# Patient Record
Sex: MALE | Race: WHITE | Employment: FULL TIME | URBAN - METROPOLITAN AREA
[De-identification: names, ages, dates, MRNs, and addresses within clinical notes are randomized per-mention and may not be internally consistent; named-entity substitution may affect disease eponyms.]

---

## 2017-01-18 ENCOUNTER — TELEPHONE (OUTPATIENT)
Dept: FAMILY MEDICINE | Facility: OTHER | Age: 52
End: 2017-01-18

## 2017-01-18 DIAGNOSIS — I10 ESSENTIAL HYPERTENSION, BENIGN: ICD-10-CM

## 2017-01-18 DIAGNOSIS — Z13.6 CARDIOVASCULAR SCREENING; LDL GOAL LESS THAN 130: Primary | ICD-10-CM

## 2017-01-18 NOTE — TELEPHONE ENCOUNTER
Summary:    Patient is due/failing the following:   BP CHECK and PHYSICAL with fasting labs    Action needed:   Patient needs office visit for physical .    Type of outreach:    Phone, spoke to patient.  patient states he will have to call back to schedule when he knows he will be home.    Questions for provider review:    None                                                                                                                                    Cat Madison       Chart routed to Care Team .        Panel Management Review      Patient has the following on his problem list:     Hypertension   Last three blood pressure readings:  BP Readings from Last 3 Encounters:   04/06/16 116/96   03/21/16 160/100   05/09/15 146/98     Blood pressure: Failed    HTN Guidelines:  Age 18-59 BP range:  Less than 140/90  Age 60-85 with Diabetes:  Less than 140/90  Age 60-85 without Diabetes:  less than 150/90      Composite cancer screening  Chart review shows that this patient is due/due soon for the following None

## 2017-02-24 ENCOUNTER — TELEPHONE (OUTPATIENT)
Dept: FAMILY MEDICINE | Facility: OTHER | Age: 52
End: 2017-02-24

## 2017-02-24 NOTE — TELEPHONE ENCOUNTER
.  Panel Management Review      Patient has the following on his problem list:     Hypertension   Last three blood pressure readings:  BP Readings from Last 3 Encounters:   04/06/16 (!) 116/96   03/21/16 (!) 160/100   05/09/15 (!) 146/98     Blood pressure: Failed    HTN Guidelines:  Age 18-59 BP range:  Less than 140/90  Age 60-85 with Diabetes:  Less than 140/90  Age 60-85 without Diabetes:  less than 150/90      Composite cancer screening  Chart review shows that this patient is due/due soon for the following None  Summary:    Patient is due/failing the following:   Physical, LDL, Tdap, Flushot, BMP     Action needed:   Patient needs office visit for physical. and Patient needs fasting lab only appointment    Type of outreach:    Phone, spoke to patient.  patient is currently working out of town. he will call 2 days in advanced to schedule appointment. cancelling orders until patient schedules appointment.    Questions for provider review:    None                                                                                                                                    Khalida Rahman MA       Chart routed to none .

## 2017-03-24 ENCOUNTER — OFFICE VISIT (OUTPATIENT)
Dept: FAMILY MEDICINE | Facility: OTHER | Age: 52
End: 2017-03-24
Payer: COMMERCIAL

## 2017-03-24 VITALS
HEIGHT: 70 IN | SYSTOLIC BLOOD PRESSURE: 138 MMHG | TEMPERATURE: 98.3 F | RESPIRATION RATE: 16 BRPM | DIASTOLIC BLOOD PRESSURE: 94 MMHG | BODY MASS INDEX: 39.27 KG/M2 | HEART RATE: 88 BPM | WEIGHT: 274.3 LBS

## 2017-03-24 DIAGNOSIS — I10 HYPERTENSION GOAL BP (BLOOD PRESSURE) < 140/90: ICD-10-CM

## 2017-03-24 DIAGNOSIS — R14.1 FLATULENCE, ERUCTATION, AND GAS PAIN: ICD-10-CM

## 2017-03-24 DIAGNOSIS — K59.00 CONSTIPATION, UNSPECIFIED CONSTIPATION TYPE: Primary | ICD-10-CM

## 2017-03-24 DIAGNOSIS — R14.2 FLATULENCE, ERUCTATION, AND GAS PAIN: ICD-10-CM

## 2017-03-24 DIAGNOSIS — R14.3 FLATULENCE, ERUCTATION, AND GAS PAIN: ICD-10-CM

## 2017-03-24 DIAGNOSIS — R10.13 EPIGASTRIC DISCOMFORT: ICD-10-CM

## 2017-03-24 PROCEDURE — 99214 OFFICE O/P EST MOD 30 MIN: CPT | Performed by: FAMILY MEDICINE

## 2017-03-24 RX ORDER — SIMETHICONE 125 MG
1 CAPSULE ORAL
Qty: 90 CAPSULE | Refills: 1 | Status: SHIPPED | OUTPATIENT
Start: 2017-03-24

## 2017-03-24 ASSESSMENT — PAIN SCALES - GENERAL: PAINLEVEL: NO PAIN (0)

## 2017-03-24 NOTE — MR AVS SNAPSHOT
After Visit Summary   3/24/2017    Leo Swartz    MRN: [id number]           Patient Information     Date Of Birth          1965        Visit Information        Provider Department      3/24/2017 1:40 PM Angela Patricio MD [facility]        Today's Diagnoses     Constipation, unspecified constipation type    -  1    Epigastric discomfort        Flatulence, eructation, and gas pain           Follow-ups after your visit        Additional Services     GASTROENTEROLOGY ADULT REF CONSULT ONLY       Preferred Location: [facility] [place] [phone]      Please be aware that coverage of these services is subject to the terms and limitations of your health insurance plan.  Call member services at your health plan with any benefit or coverage questions.  Any procedures must be performed at a [place] facility OR coordinated by your clinic's referral office.    Please bring the following with you to your appointment:    (1) Any X-Rays, CTs or MRIs which have been performed.  Contact the facility where they were done to arrange for  prior to your scheduled appointment.    (2) List of current medications   (3) This referral request   (4) Any documents/labs given to you for this referral                  Who to contact     If you have questions or need follow up information about today's clinic visit or your schedule please contact [facility] directly at [phone].  Normal or non-critical lab and imaging results will be communicated to you by MyChart, letter or phone within 4 business days after the clinic has received the results. If you do not hear from us within 7 days, please contact the clinic through MyChart or phone. If you have a critical or abnormal lab result, we will notify you by phone as soon as possible.  Submit refill requests through [employer] or call your pharmacy and they will forward the refill request to us. Please allow 3 business  "days for your refill to be completed.          Additional Information About Your Visit        [employer] Information     [employer] lets you send messages to your doctor, view your test results, renew your prescriptions, schedule appointments and more. To sign up, go to [email or web address] . Click on \"Log in\" on the left side of the screen, which will take you to the Welcome page. Then click on \"Sign up Now\" on the right side of the page.     You will be asked to enter the access code listed below, as well as some personal information. Please follow the directions to create your username and password.     Your access code is: [email or web address]  Expires: 2017  2:11 PM     Your access code will  in 90 days. If you need help or a new code, please call your [place] clinic or [phone].        Care EveryWhere ID     This is your Care EveryWhere ID. This could be used by other organizations to access your [place] medical records  [id number]        Your Vitals Were     Pulse Temperature Respirations Height BMI (Body Mass Index)       88 98.3  F (36.8  C) (Temporal) 16 5' 10\" (1.778 m) 39.36 kg/m2        Blood Pressure from Last 3 Encounters:   17 (!) 138/94   16 (!) 116/96   16 (!) 160/100    Weight from Last 3 Encounters:   17 274 lb 4.8 oz (124.4 kg)   16 278 lb 3.2 oz (126.2 kg)   16 278 lb 3.2 oz (126.2 kg)              We Performed the Following     GASTROENTEROLOGY ADULT REF CONSULT ONLY          Today's Medication Changes          These changes are accurate as of: 3/24/17  2:18 PM.  If you have any questions, ask your nurse or doctor.               Start taking these medicines.        Dose/Directions    Simethicone 125 MG Caps   Used for:  Flatulence, eructation, and gas pain   Started by:  Angela Patricio MD        Dose:  1 capsule   Take 1 capsule by mouth 3 times daily (with meals) And at bedtime as needed   Quantity:  90 capsule   Refills:  1          "   Where to get your medicines      These medications were sent to [facility] [place] [facility] [place] - [address]   [address] [place] [address]     Phone:  [phone]     Simethicone 125 MG Caps                Primary Care Provider Office Phone # Fax #    Angela Carole Patricio [phone]       [facility] [address] [place] [address]        Thank you!     Thank you for choosing [facility]  for your care. Our goal is always to provide you with excellent care. Hearing back from our patients is one way we can continue to improve our services. Please take a few minutes to complete the written survey that you may receive in the mail after your visit with us. Thank you!             Your Updated Medication List - Protect others around you: Learn how to safely use, store and throw away your medicines at www.disposemymeds.org.          This list is accurate as of: 3/24/17  2:18 PM.  Always use your most recent med list.                   Brand Name Dispense Instructions for use    NONFORMULARY      Reported on 3/24/2017       Simethicone 125 MG Caps     90 capsule    Take 1 capsule by mouth 3 times daily (with meals) And at bedtime as needed       TYLENOL PO      Take 1,000 mg by mouth every 4 hours as needed

## 2017-03-24 NOTE — NURSING NOTE
"Chief Complaint   Patient presents with     Constipation     Panel Management     MyChart, Tdap, Hep C, BMP       Initial BP (!) 138/94 (BP Location: Left arm, Cuff Size: Adult Large)  Pulse 88  Temp 98.3  F (36.8  C) (Temporal)  Resp 16  Ht 5' 10\" (1.778 m)  Wt 274 lb 4.8 oz (124.4 kg)  BMI 39.36 kg/m2 Estimated body mass index is 39.36 kg/(m^2) as calculated from the following:    Height as of this encounter: 5' 10\" (1.778 m).    Weight as of this encounter: 274 lb 4.8 oz (124.4 kg).  Medication Reconciliation: complete     Khalida Rahman MA    "

## 2017-05-09 ENCOUNTER — TELEPHONE (OUTPATIENT)
Dept: FAMILY MEDICINE | Facility: OTHER | Age: 52
End: 2017-05-09

## 2017-05-09 NOTE — TELEPHONE ENCOUNTER
You placed a referral to [facility] for a consult on 3/24/17.    It is unclear if the patient has scheduled yet, not finding a report showing they were seen.     Please forward to your team if further follow up is needed to see if they have made this appointment.      Thank you!   Marilyn/Referral Representative for [facility]

## 2017-05-09 NOTE — LETTER
May 10, 2017      Leo Swartz  [address]  [place] [address]              Dear Leo Swartz,    We received a notice that you are to be scheduled with a specialty clinic. The referral has been placed by your provider and you can call to schedule an appointment directly.     Enclosed, you will find the referral with the phone number to call to schedule an appointment.    Please call us if you have any questions or concerns.      Sincerely,    Your [facility] Care Team

## 2017-05-09 NOTE — TELEPHONE ENCOUNTER
Left message for patient to return call to clinic. When call is returned please ask patient about referral and if he needs assistance in scheduled appointment.    Khalida Rahman MA

## 2017-05-11 ENCOUNTER — TRANSFERRED RECORDS (OUTPATIENT)
Dept: HEALTH INFORMATION MANAGEMENT | Facility: CLINIC | Age: 52
End: 2017-05-11

## 2017-05-18 ENCOUNTER — TELEPHONE (OUTPATIENT)
Dept: FAMILY MEDICINE | Facility: OTHER | Age: 52
End: 2017-05-18

## 2017-05-18 DIAGNOSIS — I10 HYPERTENSION GOAL BP (BLOOD PRESSURE) < 140/90: Primary | ICD-10-CM

## 2017-05-18 DIAGNOSIS — Z11.59 NEED FOR HEPATITIS C SCREENING TEST: ICD-10-CM

## 2017-05-18 DIAGNOSIS — Z13.6 CARDIOVASCULAR SCREENING; LDL GOAL LESS THAN 130: ICD-10-CM

## 2017-05-18 NOTE — TELEPHONE ENCOUNTER
Summary:    Patient is due/failing the following:   BMP and BP CHECK    Action needed:   Patient needs non-fasting lab only appointment and Patient needs nurse only appointment.    Type of outreach:    Phone, left message for patient to call back.     Questions for provider review:    None                                                                                                                                    Cat Madison       Chart routed to Care Team .        Panel Management Review      Patient has the following on his problem list:     Hypertension   Last three blood pressure readings:  BP Readings from Last 3 Encounters:   03/24/17 (!) 138/94   04/06/16 (!) 116/96   03/21/16 (!) 160/100     Blood pressure: FAILED    HTN Guidelines:  Age 18-59 BP range:  Less than 140/90  Age 60-85 with Diabetes:  Less than 140/90  Age 60-85 without Diabetes:  less than 150/90      Composite cancer screening  Chart review shows that this patient is due/due soon for the following None

## 2017-05-18 NOTE — TELEPHONE ENCOUNTER
This patient has an appointment for lab work but does not have any orders. Please place some orders.    Thank You,  Lynn Winn MLT (ASCP)

## 2017-05-18 NOTE — TELEPHONE ENCOUNTER
Per Health Maintenance Patient is due for a BMP and Hep C. Please sign and add anything else you would like done.  Pamela Rush CMA ([facility])

## 2017-05-26 ENCOUNTER — ALLIED HEALTH/NURSE VISIT (OUTPATIENT)
Dept: FAMILY MEDICINE | Facility: OTHER | Age: 52
End: 2017-05-26
Payer: COMMERCIAL

## 2017-05-26 VITALS — DIASTOLIC BLOOD PRESSURE: 88 MMHG | SYSTOLIC BLOOD PRESSURE: 140 MMHG | HEART RATE: 66 BPM

## 2017-05-26 DIAGNOSIS — I10 HYPERTENSION GOAL BP (BLOOD PRESSURE) < 140/90: ICD-10-CM

## 2017-05-26 DIAGNOSIS — I10 HYPERTENSION GOAL BP (BLOOD PRESSURE) < 140/90: Primary | ICD-10-CM

## 2017-05-26 DIAGNOSIS — Z13.6 CARDIOVASCULAR SCREENING; LDL GOAL LESS THAN 130: ICD-10-CM

## 2017-05-26 DIAGNOSIS — Z11.59 NEED FOR HEPATITIS C SCREENING TEST: ICD-10-CM

## 2017-05-26 LAB
ANION GAP SERPL CALCULATED.3IONS-SCNC: 10 MMOL/L (ref 3–14)
BUN SERPL-MCNC: 19 MG/DL (ref 7–30)
CALCIUM SERPL-MCNC: 9 MG/DL (ref 8.5–10.1)
CHLORIDE SERPL-SCNC: 108 MMOL/L (ref 94–109)
CHOLEST SERPL-MCNC: 219 MG/DL
CO2 SERPL-SCNC: 23 MMOL/L (ref 20–32)
CREAT SERPL-MCNC: 0.9 MG/DL (ref 0.66–1.25)
GFR SERPL CREATININE-BSD FRML MDRD: 88 ML/MIN/1.7M2
GLUCOSE SERPL-MCNC: 86 MG/DL (ref 70–99)
HDLC SERPL-MCNC: 46 MG/DL
LDLC SERPL CALC-MCNC: 137 MG/DL
NONHDLC SERPL-MCNC: 173 MG/DL
POTASSIUM SERPL-SCNC: 4.2 MMOL/L (ref 3.4–5.3)
SODIUM SERPL-SCNC: 141 MMOL/L (ref 133–144)
TRIGL SERPL-MCNC: 178 MG/DL

## 2017-05-26 PROCEDURE — 99207 ZZC NO CHARGE NURSE ONLY: CPT

## 2017-05-26 PROCEDURE — 80061 LIPID PANEL: CPT | Performed by: FAMILY MEDICINE

## 2017-05-26 PROCEDURE — 36415 COLL VENOUS BLD VENIPUNCTURE: CPT | Performed by: FAMILY MEDICINE

## 2017-05-26 PROCEDURE — 86803 HEPATITIS C AB TEST: CPT | Performed by: FAMILY MEDICINE

## 2017-05-26 PROCEDURE — 80048 BASIC METABOLIC PNL TOTAL CA: CPT | Performed by: FAMILY MEDICINE

## 2017-05-26 NOTE — NURSING NOTE
Rechecked BP and it is lower but not below goal, pt stated he still does not want to take meds.        Eleonora Toure CMA ([facility])

## 2017-05-26 NOTE — LETTER
[facility]  [address]  [place] [address] [phone]             June 6, 2017    Leo Swartz  [address]  [facility] [address]              Dear Leo,     Your recent blood tests show that   1. Hepatitis C screening test is negative  2. Electrolyte levels and kidney function tests and normal  3. Some of your lipid  levels are high . I will recommend initially trying life style changes / modification to see if this will help.  Regular aerobics exercises and low cholesterol diet has been shown to help improve these numbers ..  Enclosed is a copy of the results.  It was a pleasure to see you at your last appointment.     Results for orders placed or performed in visit on 05/26/17   Basic metabolic panel  (Ca, Cl, CO2, Creat, Gluc, K, Na, BUN)   Result Value Ref Range    Sodium 141 133 - 144 mmol/L    Potassium 4.2 3.4 - 5.3 mmol/L    Chloride 108 94 - 109 mmol/L    Carbon Dioxide 23 20 - 32 mmol/L    Anion Gap 10 3 - 14 mmol/L    Glucose 86 70 - 99 mg/dL    Urea Nitrogen 19 7 - 30 mg/dL    Creatinine 0.90 0.66 - 1.25 mg/dL    GFR Estimate 88 >60 mL/min/1.7m2    GFR Estimate If Black >90   GFR Calc   >60 mL/min/1.7m2    Calcium 9.0 8.5 - 10.1 mg/dL   Hepatitis C antibody   Result Value Ref Range    Hepatitis C Antibody  NR     Nonreactive   Assay performance characteristics have not been established for newborns,   infants, and children     Lipid Profile (Chol, Trig, HDL, LDL calc)   Result Value Ref Range    Cholesterol 219 (H) <200 mg/dL    Triglycerides 178 (H) <150 mg/dL    HDL Cholesterol 46 >39 mg/dL    LDL Cholesterol Calculated 137 (H) <100 mg/dL    Non HDL Cholesterol 173 (H) <130 mg/dL         If you have any questions or concerns, please call myself or my nurse at [phone].      Sincerely,    Angela Patricio MD

## 2017-05-26 NOTE — NURSING NOTE
Leo Swartz is a 51 year old male who comes in today for a Blood Pressure check because of stomach issues so wants to check BP.    *Document pulse and BP  *Use new set of vitals button for multiple readings.  *Use extended vitals for orthostatic    Vitals as recorded, a large cuff was used.    Is not prescribed any BP medication.      Current complaints: Stomach issue has been seen in [place] for this.    Disposition: results routed to MD/DIANDRA

## 2017-05-28 LAB — HCV AB SERPL QL IA: NORMAL

## 2017-06-06 NOTE — PROGRESS NOTES
Your recent blood tests show that   1. Hepatitis C screening test is negative  2. Electrolyte levels and kidney function tests and normal  3. Some of your lipid  levels are high . I will recommend initially trying life style changes / modification to see if this will help.  Regular aerobics exercises and low cholesterol diet has been shown to help improve these numbers .

## 2017-08-23 ENCOUNTER — TELEPHONE (OUTPATIENT)
Dept: FAMILY MEDICINE | Facility: OTHER | Age: 52
End: 2017-08-23

## 2017-08-23 NOTE — TELEPHONE ENCOUNTER
Pt returned our call and was given message below and states that he doesn't want to schedule at this time.

## 2017-08-23 NOTE — TELEPHONE ENCOUNTER
Summary:    Patient is due/failing the following:   BP CHECK and PHYSICAL    Action needed:   Patient needs office visit for Physical and BP check.    Type of outreach:    Phone, left message for patient to call back.     Questions for provider review:    None                                                                                                                                    Cat Madison       Chart routed to Care Team .      Panel Management Review      Patient has the following on his problem list:     Hypertension   Last three blood pressure readings:  BP Readings from Last 3 Encounters:   05/26/17 140/88   03/24/17 (!) 138/94   04/06/16 (!) 116/96     Blood pressure: FAILED    HTN Guidelines:  Age 18-59 BP range:  Less than 140/90  Age 60-85 with Diabetes:  Less than 140/90  Age 60-85 without Diabetes:  less than 150/90        Composite cancer screening  Chart review shows that this patient is due/due soon for the following None

## 2017-12-07 ENCOUNTER — TELEPHONE (OUTPATIENT)
Dept: FAMILY MEDICINE | Facility: OTHER | Age: 52
End: 2017-12-07

## 2017-12-07 NOTE — LETTER
[facility]  [address] [place] [address]  Phone: [phone]  January 26, 2018      Leo Swartz  [address]  [facility] [address]      Dear Leo,    We care about your health and have reviewed your health plan including your medical conditions, medications, and lab results.  Based on this review, it is recommended that you follow up regarding the following health topic(s):  -High Blood Pressure  -Wellness (Physical) Visit     We recommend you take the following action(s):  -Schedule a Wellness(Physical) Visit.      Please call us at the [facility] - [phone] (or use [employer]) to address the above recommendations.     Thank you for trusting [facility] and we appreciate the opportunity to serve you.  We look forward to supporting your healthcare needs in the future.    Healthy Regards,    Your Health Care Team  [facility]

## 2017-12-07 NOTE — TELEPHONE ENCOUNTER
Summary:    Patient is due/failing the following:   BP CHECK and PHYSICAL    Action needed:   Patient needs office visit for Physical .    Type of outreach:    Phone, left message for patient to call back.     Questions for provider review:    None                                                                                                                                    Cat Madison       Chart routed to Care Team .        Panel Management Review      Patient has the following on his problem list:     Hypertension   Last three blood pressure readings:  BP Readings from Last 3 Encounters:   05/26/17 140/88   03/24/17 (!) 138/94   04/06/16 (!) 116/96     Blood pressure: FAILED    HTN Guidelines:  Age 18-59 BP range:  Less than 140/90  Age 60-85 with Diabetes:  Less than 140/90  Age 60-85 without Diabetes:  less than 150/90        Composite cancer screening  Chart review shows that this patient is due/due soon for the following None

## 2020-01-06 ENCOUNTER — HOSPITAL ENCOUNTER (EMERGENCY)
Facility: CLINIC | Age: 55
Discharge: HOME | End: 2020-01-06
Attending: EMERGENCY MEDICINE | Admitting: EMERGENCY MEDICINE
Payer: COMMERCIAL

## 2020-01-06 ENCOUNTER — APPOINTMENT (OUTPATIENT)
Dept: CT IMAGING | Facility: CLINIC | Age: 55
End: 2020-01-06
Attending: EMERGENCY MEDICINE
Payer: COMMERCIAL

## 2020-01-06 ENCOUNTER — NURSE TRIAGE (OUTPATIENT)
Dept: NURSING | Facility: CLINIC | Age: 55
End: 2020-01-06

## 2020-01-06 VITALS
OXYGEN SATURATION: 98 % | SYSTOLIC BLOOD PRESSURE: 169 MMHG | WEIGHT: 274 LBS | TEMPERATURE: 98.5 F | RESPIRATION RATE: 16 BRPM | BODY MASS INDEX: 39.22 KG/M2 | HEART RATE: 80 BPM | HEIGHT: 70 IN | DIASTOLIC BLOOD PRESSURE: 115 MMHG

## 2020-01-06 DIAGNOSIS — R10.13 ABDOMINAL PAIN, EPIGASTRIC: ICD-10-CM

## 2020-01-06 LAB
ALBUMIN SERPL-MCNC: 4.1 G/DL (ref 3.4–5)
ALBUMIN UR-MCNC: NEGATIVE MG/DL
ALP SERPL-CCNC: 103 U/L (ref 40–150)
ALT SERPL W P-5'-P-CCNC: 34 U/L (ref 0–70)
ANION GAP SERPL CALCULATED.3IONS-SCNC: 7 MMOL/L (ref 3–14)
APPEARANCE UR: CLEAR
AST SERPL W P-5'-P-CCNC: 17 U/L (ref 0–45)
BACTERIA #/AREA URNS HPF: ABNORMAL /HPF
BASOPHILS # BLD AUTO: 0 10E9/L (ref 0–0.2)
BASOPHILS NFR BLD AUTO: 0.4 %
BILIRUB SERPL-MCNC: 0.3 MG/DL (ref 0.2–1.3)
BILIRUB UR QL STRIP: NEGATIVE
BUN SERPL-MCNC: 21 MG/DL (ref 7–30)
CALCIUM SERPL-MCNC: 8.9 MG/DL (ref 8.5–10.1)
CHLORIDE SERPL-SCNC: 103 MMOL/L (ref 94–109)
CO2 SERPL-SCNC: 28 MMOL/L (ref 20–32)
COLOR UR AUTO: YELLOW
CREAT SERPL-MCNC: 0.94 MG/DL (ref 0.66–1.25)
DIFFERENTIAL METHOD BLD: NORMAL
EOSINOPHIL NFR BLD AUTO: 1.3 %
ERYTHROCYTE [DISTWIDTH] IN BLOOD BY AUTOMATED COUNT: 12.5 % (ref 10–15)
GFR SERPL CREATININE-BSD FRML MDRD: 90 ML/MIN/{1.73_M2}
GLUCOSE SERPL-MCNC: 100 MG/DL (ref 70–99)
GLUCOSE UR STRIP-MCNC: NEGATIVE MG/DL
HCT VFR BLD AUTO: 47.8 % (ref 40–53)
HGB BLD-MCNC: 16 G/DL (ref 13.3–17.7)
HGB UR QL STRIP: ABNORMAL
IMM GRANULOCYTES # BLD: 0 10E9/L (ref 0–0.4)
IMM GRANULOCYTES NFR BLD: 0.3 %
KETONES UR STRIP-MCNC: NEGATIVE MG/DL
LEUKOCYTE ESTERASE UR QL STRIP: NEGATIVE
LIPASE SERPL-CCNC: 118 U/L (ref 73–393)
LYMPHOCYTES # BLD AUTO: 1.6 10E9/L (ref 0.8–5.3)
LYMPHOCYTES NFR BLD AUTO: 22.6 %
MCH RBC QN AUTO: 29.4 PG (ref 26.5–33)
MCHC RBC AUTO-ENTMCNC: 33.5 G/DL (ref 31.5–36.5)
MCV RBC AUTO: 88 FL (ref 78–100)
MONOCYTES # BLD AUTO: 0.7 10E9/L (ref 0–1.3)
MONOCYTES NFR BLD AUTO: 9.3 %
MUCOUS THREADS #/AREA URNS LPF: PRESENT /LPF
NEUTROPHILS # BLD AUTO: 4.7 10E9/L (ref 1.6–8.3)
NEUTROPHILS NFR BLD AUTO: 66.1 %
NITRATE UR QL: NEGATIVE
NRBC # BLD AUTO: 0 10*3/UL
NRBC BLD AUTO-RTO: 0 /100
PH UR STRIP: 6 PH (ref 5–7)
PLATELET # BLD AUTO: 304 10E9/L (ref 150–450)
POTASSIUM SERPL-SCNC: 4.5 MMOL/L (ref 3.4–5.3)
PROT SERPL-MCNC: 7.7 G/DL (ref 6.8–8.8)
RBC # BLD AUTO: 5.45 10E12/L (ref 4.4–5.9)
RBC #/AREA URNS AUTO: <1 /HPF (ref 0–2)
SODIUM SERPL-SCNC: 138 MMOL/L (ref 133–144)
SOURCE: ABNORMAL
SP GR UR STRIP: 1.01 (ref 1–1.03)
UROBILINOGEN UR STRIP-MCNC: 0 MG/DL (ref 0–2)
WBC # BLD AUTO: 7.1 10E9/L (ref 4–11)
WBC #/AREA URNS AUTO: 1 /HPF (ref 0–5)

## 2020-01-06 PROCEDURE — 80053 COMPREHEN METABOLIC PANEL: CPT | Performed by: EMERGENCY MEDICINE

## 2020-01-06 PROCEDURE — 25000125 ZZHC RX 250: Performed by: RADIOLOGY

## 2020-01-06 PROCEDURE — 99285 EMERGENCY DEPT VISIT HI MDM: CPT | Mod: 25 | Performed by: EMERGENCY MEDICINE

## 2020-01-06 PROCEDURE — 74177 CT ABD & PELVIS W/CONTRAST: CPT

## 2020-01-06 PROCEDURE — 25000132 ZZH RX MED GY IP 250 OP 250 PS 637: Performed by: EMERGENCY MEDICINE

## 2020-01-06 PROCEDURE — 85025 COMPLETE CBC W/AUTO DIFF WBC: CPT | Performed by: EMERGENCY MEDICINE

## 2020-01-06 PROCEDURE — 25000128 H RX IP 250 OP 636: Performed by: RADIOLOGY

## 2020-01-06 PROCEDURE — 25000125 ZZHC RX 250: Performed by: EMERGENCY MEDICINE

## 2020-01-06 PROCEDURE — 99284 EMERGENCY DEPT VISIT MOD MDM: CPT | Mod: Z6 | Performed by: EMERGENCY MEDICINE

## 2020-01-06 PROCEDURE — 83690 ASSAY OF LIPASE: CPT | Performed by: EMERGENCY MEDICINE

## 2020-01-06 PROCEDURE — 81001 URINALYSIS AUTO W/SCOPE: CPT | Performed by: EMERGENCY MEDICINE

## 2020-01-06 RX ORDER — IOPAMIDOL 755 MG/ML
500 INJECTION, SOLUTION INTRAVASCULAR ONCE
Status: COMPLETED | OUTPATIENT
Start: 2020-01-06 | End: 2020-01-06

## 2020-01-06 RX ADMIN — IOPAMIDOL 100 ML: 755 INJECTION, SOLUTION INTRAVENOUS at 13:32

## 2020-01-06 RX ADMIN — LIDOCAINE HYDROCHLORIDE 30 ML: 20 SOLUTION ORAL; TOPICAL at 12:48

## 2020-01-06 RX ADMIN — SODIUM CHLORIDE 60 ML: 9 INJECTION, SOLUTION INTRAVENOUS at 13:32

## 2020-01-06 ASSESSMENT — MIFFLIN-ST. JEOR: SCORE: 2079.11

## 2020-01-06 NOTE — ED TRIAGE NOTES
Abdominal issues for years and states pain is the the left upper chest and wraps around to back. Has been in for this in the past but new irritation that goes up into chest.  New symptoms have been for about 1 week now.  Does feel like he has to poop all the time.

## 2020-01-06 NOTE — ED PROVIDER NOTES
"  History     Chief Complaint   Patient presents with     Abdominal Pain     The history is provided by the patient.     Leo Swartz is a 54 year old male who presents to the emergency department for abdominal pain. Patient reports having years of stomach problems, however, has had new pain for the last couple weeks. He states having epigastric \"constant irritation\" with severe pain that waxed and wanes. He states having left upper chest that wraps around to his back. He reports having bilateral flank pain and problems sleeping. He denies any fever, chills, black tarry/bloody stool, vomiting, dysuria or hematuria. He reports his last bowel movement was this morning, brown and watery. He states he took some Mild of Magnesia twice in the last week which did help. He states he feels like he has to have a bowel movement all the time. He has seen a GI doctor (several years ago) and was informed to increase his fiber intake and use a laxative which he did. He states they did help him up until just recently. The GI provider also informed him to do a \"colonoscopy flush\" without the colonoscopy which he states also did help. He has never had an endoscopy. His last colonoscopy was 4/6/16 and his last CT scan of his abdomen was 3/22/16. He quit drinking heavy amounts of alcohol 7 years ago, however, does have occasional alcohol. He denies any family history of colon cancer.    Allergies:  Allergies   Allergen Reactions     Aspirin Hives       Problem List:    Patient Active Problem List    Diagnosis Date Noted     Hypertension goal BP (blood pressure) < 140/90 03/24/2017     Priority: Medium     Constipation, unspecified constipation type 03/24/2017     Priority: Medium     Epigastric discomfort 03/24/2017     Priority: Medium     Flatulence, eructation, and gas pain 03/24/2017     Priority: Medium     CARDIOVASCULAR SCREENING; LDL GOAL LESS THAN 130 03/24/2016     Priority: Medium     Umbilical hernia, recurrence not " "specified 03/23/2016     Priority: Medium     Bilateral recurrent inguinal hernia without obstruction or gangrene 03/23/2016     Priority: Medium     Fatty infiltration of liver 03/23/2016     Priority: Medium     Allergic rhinitis      Priority: Medium     Problem list name updated by automated process. Provider to review       Essential hypertension, benign      Priority: Medium     Sinusitis, chronic      Priority: Medium     Problem list name updated by automated process. Provider to review          Past Medical History:    Past Medical History:   Diagnosis Date     Allergic rhinitis, cause unspecified      Essential hypertension, benign      Unspecified sinusitis (chronic)        Past Surgical History:    Past Surgical History:   Procedure Laterality Date     ARTHROSCOPY KNEE WITH MENISCAL REPAIR Right      COLONOSCOPY N/A 4/6/2016    Procedure: COMBINED COLONOSCOPY, SINGLE OR MULTIPLE BIOPSY/POLYPECTOMY BY BIOPSY;  Surgeon: Yrn Ryan MD;  Location: [facility]     HC REMOVAL OF TONSILS,<13 Y/O      Tonsils <10y.o.       Family History:    Family History   Problem Relation Age of Onset     Hypertension Maternal Grandmother        Social History:  Marital Status:   [2]  Social History     Tobacco Use     Smoking status: Never Smoker   Substance Use Topics     Alcohol use: No     Alcohol/week: 0.0 standard drinks     Drug use: No        Medications:    Acetaminophen (TYLENOL PO)  NONFORMULARY  Simethicone 125 MG CAPS          Review of Systems   All other systems reviewed and are negative.      Physical Exam   BP: (!) 169/115  Pulse: 80  Temp: 98.5  F (36.9  C)  Resp: 16  Height: 177.8 cm (5' 10\")  Weight: 124.3 kg (274 lb)  SpO2: 98 %      Physical Exam  Vitals signs and nursing note reviewed.   Constitutional:       Appearance: He is well-developed.   HENT:      Head: Normocephalic.      Nose: Nose normal.   Eyes:      General: No scleral icterus.     Conjunctiva/sclera: Conjunctivae normal.   Neck:    "   Musculoskeletal: Normal range of motion and neck supple.   Cardiovascular:      Rate and Rhythm: Normal rate and regular rhythm.      Heart sounds: Normal heart sounds.   Pulmonary:      Effort: Pulmonary effort is normal.      Breath sounds: Normal breath sounds.   Abdominal:      Palpations: Abdomen is soft.      Tenderness: There is abdominal tenderness in the epigastric area. There is no guarding or rebound.   Musculoskeletal: Normal range of motion.   Skin:     General: Skin is warm and dry.      Coloration: Skin is not pale.   Neurological:      General: No focal deficit present.      Mental Status: He is alert.      Motor: No abnormal muscle tone.   Psychiatric:         Mood and Affect: Mood normal.         Behavior: Behavior normal.         ED Course        Procedures                 Results for orders placed or performed during the hospital encounter of 01/06/20 (from the past 24 hour(s))   CBC with platelets differential   Result Value Ref Range    WBC 7.1 4.0 - 11.0 10e9/L    RBC Count 5.45 4.4 - 5.9 10e12/L    Hemoglobin 16.0 13.3 - 17.7 g/dL    Hematocrit 47.8 40.0 - 53.0 %    MCV 88 78 - 100 fl    MCH 29.4 26.5 - 33.0 pg    MCHC 33.5 31.5 - 36.5 g/dL    RDW 12.5 10.0 - 15.0 %    Platelet Count 304 150 - 450 10e9/L    Diff Method Automated Method     % Neutrophils 66.1 %    % Lymphocytes 22.6 %    % Monocytes 9.3 %    % Eosinophils 1.3 %    % Basophils 0.4 %    % Immature Granulocytes 0.3 %    Nucleated RBCs 0 0 /100    Absolute Neutrophil 4.7 1.6 - 8.3 10e9/L    Absolute Lymphocytes 1.6 0.8 - 5.3 10e9/L    Absolute Monocytes 0.7 0.0 - 1.3 10e9/L    Absolute Basophils 0.0 0.0 - 0.2 10e9/L    Abs Immature Granulocytes 0.0 0 - 0.4 10e9/L    Absolute Nucleated RBC 0.0    Comprehensive metabolic panel   Result Value Ref Range    Sodium 138 133 - 144 mmol/L    Potassium 4.5 3.4 - 5.3 mmol/L    Chloride 103 94 - 109 mmol/L    Carbon Dioxide 28 20 - 32 mmol/L    Anion Gap 7 3 - 14 mmol/L    Glucose 100 (H)  70 - 99 mg/dL    Urea Nitrogen 21 7 - 30 mg/dL    Creatinine 0.94 0.66 - 1.25 mg/dL    GFR Estimate 90 >60 mL/min/[1.73_m2]    GFR Estimate If Black >90 >60 mL/min/[1.73_m2]    Calcium 8.9 8.5 - 10.1 mg/dL    Bilirubin Total 0.3 0.2 - 1.3 mg/dL    Albumin 4.1 3.4 - 5.0 g/dL    Protein Total 7.7 6.8 - 8.8 g/dL    Alkaline Phosphatase 103 40 - 150 U/L    ALT 34 0 - 70 U/L    AST 17 0 - 45 U/L   Lipase   Result Value Ref Range    Lipase 118 73 - 393 U/L   UA reflex to Microscopic and Culture   Result Value Ref Range    Color Urine Yellow     Appearance Urine Clear     Glucose Urine Negative NEG^Negative mg/dL    Bilirubin Urine Negative NEG^Negative    Ketones Urine Negative NEG^Negative mg/dL    Specific Gravity Urine 1.010 1.003 - 1.035    Blood Urine Small (A) NEG^Negative    pH Urine 6.0 5.0 - 7.0 pH    Protein Albumin Urine Negative NEG^Negative mg/dL    Urobilinogen mg/dL 0.0 0.0 - 2.0 mg/dL    Nitrite Urine Negative NEG^Negative    Leukocyte Esterase Urine Negative NEG^Negative    Source Midstream Urine     RBC Urine <1 0 - 2 /HPF    WBC Urine 1 0 - 5 /HPF    Bacteria Urine Few (A) NEG^Negative /HPF    Mucous Urine Present (A) NEG^Negative /LPF   CT Abdomen Pelvis w Contrast    Narrative    CT ABDOMEN AND PELVIS WITH CONTRAST   1/6/2020 1:36 PM     HISTORY:  Epigastric pain.    TECHNIQUE:  CT abdomen and pelvis with Isovue-370, 100 mL IV.  Radiation dose for this scan was reduced using automated exposure  control, adjustment of the mA and/or kV according to patient size, or  iterative reconstruction technique.    COMPARISON: CT abdomen and pelvis on 3/22/2016.    FINDINGS:   Lower chest: 3 mm left lower lobe subpleural pulmonary nodule (series  3 image 7) not significantly changed as compared to 3/22/2016 exam.    Abdomen/pelvis: Diffuse hypoattenuation of the liver, likely due to  underlying hepatic steatosis. The gallbladder is unremarkable. No  splenomegaly. No adrenal nodules. No radiodense kidney stones  or  hydronephrosis. No main pancreatic ductal dilatation or solid  pancreatic mass.    No abnormally dilated bowel loops. No significant free fluid in the  abdomen or pelvis. No free peritoneal or portal venous gas. Scattered  predominantly aortobiiliac atherosclerotic vascular calcification. The  prostate gland is mildly enlarged measuring 5 cm in transverse  diameter. The appendix is visualized and appears normal.    Bones and soft tissues: No suspicious osseous lesion. Significant  degenerative changes of the left hip joint with joint space loss,  subchondral sclerosis and cystic changes. No suspicious osseous  lesion. Small fat-containing umbilical/periumbilical hernia (series 3  image 56).      Impression    IMPRESSION:  1. No acute pathology in the abdomen or pelvis.  2. Hepatic steatosis.  3. Mild prostatomegaly.    COURTNEY ROBERSON MD       Medications   lidocaine (XYLOCAINE) 2 % 15 mL, alum & mag hydroxide-simethicone (MYLANTA ES/MAALOX  ES) 15 mL GI Cocktail (30 mLs Oral Given 1/6/20 1248)   iopamidol (ISOVUE-370) solution 500 mL (100 mLs Intravenous Given 1/6/20 1332)   sodium chloride (PF) 0.9% PF flush 3 mL (3 mLs Intravenous Given 1/6/20 1331)   sodium chloride 0.9 % bag 100mL for CT scan flush use (60 mLs Intravenous Given 1/6/20 1332)       Assessments & Plan (with Medical Decision Making)  Epigastric abdominal pain.  This improved with a GI cocktail.  The patient also has generalized abdominal pain and with bowel movements and chronic intermittent constipation.  No evidence for GI bleed.  I have advised him to take omeprazole daily and follow-up for an endoscopy which I referred him for.  The differential diagnosis, treatment options, risks and follow-up discussed with a competent patient who agrees with the plan.     I have reviewed the nursing notes.    I have reviewed the findings, diagnosis, plan and need for follow up with the patient.      Discharge Medication List as of 1/6/2020  2:59 PM           Final diagnoses:   Abdominal pain, epigastric     This document serves as a record of services personally performed by David Crawford MD. It was created on their behalf by Leatha Maradiaga, a trained medical scribe. The creation of this record is based on the provider's personal observations and the statements of the patient. This document has been checked and approved by the attending provider.    Note: Chart documentation done in part with Dragon Voice Recognition software. Although reviewed after completion, some word and grammatical errors may remain.    1/6/2020   [facility] EMERGENCY DEPARTMENT     Michael Crawford MD  01/06/20 1503

## 2020-01-06 NOTE — DISCHARGE INSTRUCTIONS
Return to the ER if you develop new or worsening symptoms.  Take omeprazole or Prilosec every day.  Follow-up for your endoscopy.

## 2020-01-06 NOTE — ED AVS SNAPSHOT
[facility] Emergency Department  [address] [place] [address]  Phone:  [phone]  Fax:  [phone]                                    Leo Swartz   MRN: [id number]    Department:  [facility] Emergency Department   Date of Visit:  1/6/2020           After Visit Summary Signature Page    I have received my discharge instructions, and my questions have been answered. I have discussed any challenges I see with this plan with the nurse or doctor.    ..........................................................................................................................................  Patient/Patient Representative Signature      ..........................................................................................................................................  Patient Representative Print Name and Relationship to Patient    ..................................................               ................................................  Date                                   Time    ..........................................................................................................................................  Reviewed by Signature/Title    ...................................................              ..............................................  Date                                               Time          22EPIC Rev 08/18

## 2020-01-06 NOTE — TELEPHONE ENCOUNTER
"Leo reports history of stomach problems for years, has been seen in ED for this previously.  Reports he does have an umbilical hernia which is not thought to be the cause of his abdominal pain per clinic provider.  Did see GI who recommended colonoscopy prep (without colonoscopy) to cleanse GI tract.  Worsening of pain since last night (1/5/20) and on left flank / side.  Pain \"6-7\"/10.  Does report pain in center of stomach at base of rib cage, feels like \"gas pains\".  Advised ED now.      Reason for Disposition    Pain lasting > 10 minutes and over 50 years old    Protocols used: ABDOMINAL PAIN - UPPER-A-OH      "

## 2020-01-07 ENCOUNTER — TELEPHONE (OUTPATIENT)
Facility: CLINIC | Age: 55
End: 2020-01-07

## 2020-01-07 NOTE — LETTER
[facility] [address] [address]  [phone]      January 10, 2020      Leo Swartz  [address]  [facility] [address]      Dear Leo:     To better serve you, we are sending this letter to notify you that we have attempted to contact you by telephone to schedule the following procedure(s) ordered by your physician.     _______   Colonoscopy   _____x__   Upper GI Endoscopy (EGD)   _______   Colonoscopy and Upper GI Endoscopy    To provide the highest quality of care, we strongly encourage you to call and schedule the prescribed test/procedure at your earliest convenience.   The number to the Specialty Scheduling department is [phone] and the hours are 8:00am - 4:30pm Monday through Friday.   We look forward to hearing from you.    Sincerely,    [facility]

## 2022-01-21 ENCOUNTER — HOSPITAL ENCOUNTER (EMERGENCY)
Facility: CLINIC | Age: 57
Discharge: HOME | End: 2022-01-21
Attending: EMERGENCY MEDICINE | Admitting: EMERGENCY MEDICINE
Payer: COMMERCIAL

## 2022-01-21 ENCOUNTER — APPOINTMENT (OUTPATIENT)
Dept: GENERAL RADIOLOGY | Facility: CLINIC | Age: 57
End: 2022-01-21
Attending: EMERGENCY MEDICINE
Payer: COMMERCIAL

## 2022-01-21 VITALS
RESPIRATION RATE: 17 BRPM | TEMPERATURE: 97.5 F | SYSTOLIC BLOOD PRESSURE: 154 MMHG | DIASTOLIC BLOOD PRESSURE: 90 MMHG | BODY MASS INDEX: 39.6 KG/M2 | WEIGHT: 276 LBS | HEART RATE: 71 BPM | OXYGEN SATURATION: 98 %

## 2022-01-21 DIAGNOSIS — J30.2 SEASONAL ALLERGIC RHINITIS, UNSPECIFIED TRIGGER: ICD-10-CM

## 2022-01-21 DIAGNOSIS — R05.9 COUGH: ICD-10-CM

## 2022-01-21 LAB
FLUAV RNA SPEC QL NAA+PROBE: NEGATIVE
FLUBV RNA RESP QL NAA+PROBE: NEGATIVE
SARS-COV-2 RNA RESP QL NAA+PROBE: NEGATIVE

## 2022-01-21 PROCEDURE — 99285 EMERGENCY DEPT VISIT HI MDM: CPT | Mod: 25 | Performed by: EMERGENCY MEDICINE

## 2022-01-21 PROCEDURE — 99284 EMERGENCY DEPT VISIT MOD MDM: CPT | Mod: 25 | Performed by: EMERGENCY MEDICINE

## 2022-01-21 PROCEDURE — 71045 X-RAY EXAM CHEST 1 VIEW: CPT

## 2022-01-21 PROCEDURE — C9803 HOPD COVID-19 SPEC COLLECT: HCPCS | Performed by: EMERGENCY MEDICINE

## 2022-01-21 PROCEDURE — 87636 SARSCOV2 & INF A&B AMP PRB: CPT | Performed by: EMERGENCY MEDICINE

## 2022-01-21 PROCEDURE — 93010 ELECTROCARDIOGRAM REPORT: CPT | Performed by: EMERGENCY MEDICINE

## 2022-01-21 PROCEDURE — 93005 ELECTROCARDIOGRAM TRACING: CPT | Performed by: EMERGENCY MEDICINE

## 2022-01-21 RX ORDER — FLUTICASONE PROPIONATE 50 MCG
1 SPRAY, SUSPENSION (ML) NASAL DAILY
Qty: 9.9 ML | Refills: 0 | Status: SHIPPED | OUTPATIENT
Start: 2022-01-21

## 2022-01-21 NOTE — ED TRIAGE NOTES
Pt presents with intermittent cough and chest pain with cough yesterday. Pt states spouse diagnosed with pneumonia 3 weeks ago.

## 2022-01-21 NOTE — DISCHARGE INSTRUCTIONS
Your work-up today was reassuring.  Your chest x-ray was clear.  You do not have influenza or COVID by testing today.  I suspect you probably have either viral illness or seasonal allergies.  Try over-the-counter Zyrtec daily and add Flonase daily.  The Flonase does take a period of time to be effective and you must use it daily to be effective.

## 2022-01-21 NOTE — ED PROVIDER NOTES
History     Chief Complaint   Patient presents with     Cough     HPI  Leo Swartz is a 56 year old male who presents with intermittent congestion and cough for last 3 months.  Current symptoms over the last few days and now has a nonproductive cough again, chest pressure, eye irritation and nasal congestion.  Denies fever or chills.  Patient denies history of pneumonia or asthma.  Has an allergy to aspirin.  No history of eczema.  Non-smoker.  Immunization including pertussis is up-to-date.  Wife was sick with pneumonia 3 weeks ago.  Patient has not received either influenza or COVID immunizations.  Denies personal history of cardiac disease.  Does not take anything for symptoms prior to arrival today.  States he has allergies in the winter and did have allergy testing done.  He was allergic to something but does not recall what it was.    Allergies:  Allergies   Allergen Reactions     Aspirin Hives       Problem List:    Patient Active Problem List    Diagnosis Date Noted     Hypertension goal BP (blood pressure) < 140/90 03/24/2017     Priority: Medium     Constipation, unspecified constipation type 03/24/2017     Priority: Medium     Epigastric discomfort 03/24/2017     Priority: Medium     Flatulence, eructation, and gas pain 03/24/2017     Priority: Medium     CARDIOVASCULAR SCREENING; LDL GOAL LESS THAN 130 03/24/2016     Priority: Medium     Umbilical hernia, recurrence not specified 03/23/2016     Priority: Medium     Bilateral recurrent inguinal hernia without obstruction or gangrene 03/23/2016     Priority: Medium     Fatty infiltration of liver 03/23/2016     Priority: Medium     Allergic rhinitis      Priority: Medium     Problem list name updated by automated process. Provider to review       Essential hypertension, benign      Priority: Medium     Sinusitis, chronic      Priority: Medium     Problem list name updated by automated process. Provider to review          Past Medical History:    Past  Medical History:   Diagnosis Date     Allergic rhinitis, cause unspecified      Essential hypertension, benign      Unspecified sinusitis (chronic)        Past Surgical History:    Past Surgical History:   Procedure Laterality Date     ARTHROSCOPY KNEE WITH MENISCAL REPAIR Right      COLONOSCOPY N/A 4/6/2016    Procedure: COMBINED COLONOSCOPY, SINGLE OR MULTIPLE BIOPSY/POLYPECTOMY BY BIOPSY;  Surgeon: Yrn Ryan MD;  Location: [facility]     HC REMOVAL OF TONSILS,<9 Y/O      Tonsils <10y.o.       Family History:    Family History   Problem Relation Age of Onset     Hypertension Maternal Grandmother        Social History:  Marital Status:   [2]  Social History     Tobacco Use     Smoking status: Never Smoker     Smokeless tobacco: Not on file   Substance Use Topics     Alcohol use: No     Alcohol/week: 0.0 standard drinks     Drug use: No        Medications:    fluticasone (FLONASE) 50 MCG/ACT nasal spray  Acetaminophen (TYLENOL PO)  NONFORMULARY  Simethicone 125 MG CAPS          Review of Systems all other systems are reviewed and are negative.    Physical Exam   BP: (!) 154/90  Pulse: 73  Temp: 97.5  F (36.4  C)  Resp: 16  Weight: 125.2 kg (276 lb)  SpO2: 99 %      Physical Exam General alert male does not look toxic or ill.  HEENT reveals ears to be clear bilaterally.  Eyes are injected but no mattering.  Nasal mucosal swelling to near occlusion of the left.  Green discharge on the right.  No tenderness over the frontal or maxillary sinuses.  Mild erythema of the soft palate with no postnasal drip or exudate noted.  Neck is supple without stridor.  Lungs are clear without adventitious wheezing.  Normal cardiac auscultation.    ED Course                 EKG Interpretation:      Interpreted by Charlie Berrios MD  Time reviewed:10::15  Symptoms at time of EKG: CP   Rhythm: Normal sinus   Rate: Normal  Axis: Normal  Ectopy: None  Conduction: Normal  ST Segments/ T Waves: No acute ischemic changes  Q Waves:  None  Comparison to prior: Unchanged from 5/15    Clinical Impression: normal EKG       Procedures              Critical Care time:  none               Results for orders placed or performed during the hospital encounter of 01/21/22 (from the past 24 hour(s))   Symptomatic; Yes; 1/18/2022 Influenza A/B & SARS-CoV2 (COVID-19) Virus PCR Multiplex Nasopharyngeal    Specimen: Nasopharyngeal; Swab   Result Value Ref Range    Influenza A PCR Negative Negative    Influenza B PCR Negative Negative    SARS CoV2 PCR Negative Negative    Narrative    Testing was performed using the chidi SARS-CoV-2 & Influenza A/B Assay on the chidi Lauryn System. This test should be ordered for the detection of SARS-CoV-2 and influenza viruses in individuals who meet clinical and/or epidemiological criteria. Test performance is unknown in asymptomatic patients. This test is for in vitro diagnostic use under the FDA EUA for laboratories certified under CLIA to perform moderate and/or high complexity testing. This test has not been FDA cleared or approved. A negative result does not rule out the presence of PCR inhibitors in the specimen or target RNA in concentration below the limit of detection for the assay. If only one viral target is positive but coinfection with multiple targets is suspected, the sample should be re-tested with another FDA cleared, approved or authorized test, if coinfection would change clinical management. [facility] are certified under the Clinical Laboratory Improvement Amendments of 1988 (CLIA-88) as  qualified to perform moderate and/or high complexity laboratory testing.   XR Chest Port 1 View    Narrative    CHEST ONE VIEW PORTABLE  1/21/2022 10:25 AM     HISTORY: Cough.    COMPARISON: CT abdomen and pelvis dated 1/6/2020.    FINDINGS:  Cardiac silhouette is mildly prominent which is likely due  to the AP technique. Lungs are grossly clear. No pneumothorax,  significant pleural fluid collection,  or acute osseous fracture.  Mediastinum is unremarkable.      Impression    IMPRESSION:  1. Cardiac silhouette is prominent, which is likely artifactual, due  to the AP technique. Cardiomegaly is difficult to entirely exclude.  2. No other evidence of acute cardiopulmonary disease is seen. No  pneumonia is identified. No etiology for patient's cough is seen.        Medications - No data to display  EKG is ordered.  Chest x-ray is ordered.  Influenza and COVID swabs ordered.  Assessments & Plan (with Medical Decision Making)   Leo Swartz is a 56 year old male who presents with intermittent congestion and cough for last 3 months.  Current symptoms over the last few days and now has a nonproductive cough again, chest pressure, eye irritation and nasal congestion.  Denies fever or chills.  Patient denies history of pneumonia or asthma.  Has an allergy to aspirin.  No history of eczema.  Non-smoker.  Immunization including pertussis is up-to-date.  Wife was sick with pneumonia 3 weeks ago.  Patient has not received either influenza or COVID immunizations.  Denies personal history of cardiac disease.  Does not take anything for symptoms prior to arrival today.  States he has allergies in the winter and did have allergy testing done.  He was allergic to something but does not recall what it was.  On presentation patient was afebrile and vitally stable.  He was not hypoxic.  Did have nasal mucosal swelling and some green discharge in the right.  No adventitious lung sounds.  Normal cardiac auscultation.  EKG showed no acute ischemic changes.  Unchanged from previous EKG done 5/15.  X-ray is clear.  Influenza and COVID test were negative.  Suspect allergies with his eye irritation nasal congestion.  Zyrtec as directed and Flonase daily.  I have reviewed the nursing notes.    I have reviewed the findings, diagnosis, plan and need for follow up with the patient.       New Prescriptions    FLUTICASONE (FLONASE) 50 MCG/ACT  NASAL SPRAY    Spray 1 spray into both nostrils daily       Final diagnoses:   Seasonal allergic rhinitis, unspecified trigger   Cough       1/21/2022   [facility] EMERGENCY DEPT     Charlie Berrios MD  01/21/22 1414
